# Patient Record
Sex: MALE | Race: WHITE | Employment: OTHER | URBAN - METROPOLITAN AREA
[De-identification: names, ages, dates, MRNs, and addresses within clinical notes are randomized per-mention and may not be internally consistent; named-entity substitution may affect disease eponyms.]

---

## 2020-11-10 ENCOUNTER — HOSPITAL ENCOUNTER (OUTPATIENT)
Dept: PREADMISSION TESTING | Age: 70
Discharge: HOME | End: 2020-11-14
Payer: MEDICARE

## 2020-11-10 VITALS
HEART RATE: 70 BPM | OXYGEN SATURATION: 98 % | HEIGHT: 70 IN | WEIGHT: 211 LBS | RESPIRATION RATE: 16 BRPM | DIASTOLIC BLOOD PRESSURE: 94 MMHG | SYSTOLIC BLOOD PRESSURE: 153 MMHG | BODY MASS INDEX: 30.21 KG/M2 | TEMPERATURE: 98.3 F

## 2020-11-10 LAB
ANION GAP SERPL CALCULATED.3IONS-SCNC: 10 MEQ/L (ref 9–15)
BILIRUBIN URINE: NEGATIVE
BLOOD, URINE: NEGATIVE
BUN BLDV-MCNC: 14 MG/DL (ref 8–23)
CALCIUM SERPL-MCNC: 9.2 MG/DL (ref 8.5–9.9)
CHLORIDE BLD-SCNC: 104 MEQ/L (ref 95–107)
CLARITY: CLEAR
CO2: 26 MEQ/L (ref 20–31)
COLOR: YELLOW
CREAT SERPL-MCNC: 1 MG/DL (ref 0.7–1.2)
EKG ATRIAL RATE: 69 BPM
EKG P AXIS: 33 DEGREES
EKG P-R INTERVAL: 166 MS
EKG Q-T INTERVAL: 434 MS
EKG QRS DURATION: 96 MS
EKG QTC CALCULATION (BAZETT): 465 MS
EKG R AXIS: 32 DEGREES
EKG T AXIS: 49 DEGREES
EKG VENTRICULAR RATE: 69 BPM
GFR AFRICAN AMERICAN: >60
GFR NON-AFRICAN AMERICAN: >60
GLUCOSE BLD-MCNC: 91 MG/DL (ref 70–99)
GLUCOSE URINE: NEGATIVE MG/DL
HCT VFR BLD CALC: 41.1 % (ref 42–52)
HEMOGLOBIN: 13.7 G/DL (ref 14–18)
KETONES, URINE: NEGATIVE MG/DL
LEUKOCYTE ESTERASE, URINE: NEGATIVE
MCH RBC QN AUTO: 29.3 PG (ref 27–31.3)
MCHC RBC AUTO-ENTMCNC: 33.3 % (ref 33–37)
MCV RBC AUTO: 88 FL (ref 80–100)
NITRITE, URINE: NEGATIVE
PDW BLD-RTO: 13.4 % (ref 11.5–14.5)
PH UA: 7.5 (ref 5–9)
PLATELET # BLD: 235 K/UL (ref 130–400)
POTASSIUM SERPL-SCNC: 4.1 MEQ/L (ref 3.4–4.9)
PROTEIN UA: NEGATIVE MG/DL
RBC # BLD: 4.67 M/UL (ref 4.7–6.1)
SODIUM BLD-SCNC: 140 MEQ/L (ref 135–144)
SPECIFIC GRAVITY UA: 1.01 (ref 1–1.03)
URINE REFLEX TO CULTURE: NORMAL
UROBILINOGEN, URINE: 0.2 E.U./DL
WBC # BLD: 4.9 K/UL (ref 4.8–10.8)

## 2020-11-10 PROCEDURE — 81003 URINALYSIS AUTO W/O SCOPE: CPT

## 2020-11-10 PROCEDURE — 85027 COMPLETE CBC AUTOMATED: CPT

## 2020-11-10 PROCEDURE — 93005 ELECTROCARDIOGRAM TRACING: CPT | Performed by: ORTHOPAEDIC SURGERY

## 2020-11-10 PROCEDURE — 80048 BASIC METABOLIC PNL TOTAL CA: CPT

## 2020-11-10 RX ORDER — AMLODIPINE BESYLATE 5 MG/1
5 TABLET ORAL DAILY
COMMUNITY

## 2020-11-10 RX ORDER — CEFAZOLIN SODIUM 2 G/50ML
2 SOLUTION INTRAVENOUS ONCE
Status: CANCELLED | OUTPATIENT
Start: 2020-11-17

## 2020-11-10 RX ORDER — DOXAZOSIN MESYLATE 4 MG/1
4 TABLET ORAL NIGHTLY
COMMUNITY

## 2020-11-11 ENCOUNTER — NURSE ONLY (OUTPATIENT)
Dept: PRIMARY CARE CLINIC | Age: 70
End: 2020-11-11

## 2020-11-11 PROCEDURE — 93010 ELECTROCARDIOGRAM REPORT: CPT | Performed by: INTERNAL MEDICINE

## 2020-11-12 ENCOUNTER — HOSPITAL ENCOUNTER (OUTPATIENT)
Age: 70
Setting detail: SPECIMEN
Discharge: HOME | End: 2020-11-12
Payer: MEDICARE

## 2020-11-13 LAB
SARS-COV-2: NOT DETECTED
SOURCE: NORMAL

## 2020-11-17 ENCOUNTER — ANESTHESIA (OUTPATIENT)
Dept: OPERATING ROOM | Age: 70
DRG: 483 | End: 2020-11-17
Payer: MEDICARE

## 2020-11-17 ENCOUNTER — APPOINTMENT (OUTPATIENT)
Dept: GENERAL RADIOLOGY | Age: 70
DRG: 483 | End: 2020-11-17
Attending: ORTHOPAEDIC SURGERY
Payer: MEDICARE

## 2020-11-17 ENCOUNTER — ANESTHESIA EVENT (OUTPATIENT)
Dept: OPERATING ROOM | Age: 70
DRG: 483 | End: 2020-11-17
Payer: MEDICARE

## 2020-11-17 ENCOUNTER — HOSPITAL ENCOUNTER (INPATIENT)
Age: 70
LOS: 1 days | Discharge: HOME | DRG: 483 | End: 2020-11-18
Attending: ORTHOPAEDIC SURGERY | Admitting: ORTHOPAEDIC SURGERY
Payer: MEDICARE

## 2020-11-17 VITALS
RESPIRATION RATE: 18 BRPM | DIASTOLIC BLOOD PRESSURE: 88 MMHG | OXYGEN SATURATION: 100 % | SYSTOLIC BLOOD PRESSURE: 143 MMHG

## 2020-11-17 PROBLEM — Z96.612 STATUS POST TOTAL SHOULDER ARTHROPLASTY, LEFT: Status: ACTIVE | Noted: 2020-11-17

## 2020-11-17 LAB
ABO/RH: NORMAL
ANTIBODY SCREEN: NORMAL
GRAM STAIN RESULT: NORMAL

## 2020-11-17 PROCEDURE — 6360000002 HC RX W HCPCS: Performed by: ORTHOPAEDIC SURGERY

## 2020-11-17 PROCEDURE — 73030 X-RAY EXAM OF SHOULDER: CPT

## 2020-11-17 PROCEDURE — 2709999900 HC NON-CHARGEABLE SUPPLY: Performed by: ORTHOPAEDIC SURGERY

## 2020-11-17 PROCEDURE — 7100000000 HC PACU RECOVERY - FIRST 15 MIN: Performed by: ORTHOPAEDIC SURGERY

## 2020-11-17 PROCEDURE — 87070 CULTURE OTHR SPECIMN AEROBIC: CPT

## 2020-11-17 PROCEDURE — 3600000014 HC SURGERY LEVEL 4 ADDTL 15MIN: Performed by: ORTHOPAEDIC SURGERY

## 2020-11-17 PROCEDURE — 3700000000 HC ANESTHESIA ATTENDED CARE: Performed by: ORTHOPAEDIC SURGERY

## 2020-11-17 PROCEDURE — 2580000003 HC RX 258: Performed by: ORTHOPAEDIC SURGERY

## 2020-11-17 PROCEDURE — 86850 RBC ANTIBODY SCREEN: CPT

## 2020-11-17 PROCEDURE — 6370000000 HC RX 637 (ALT 250 FOR IP): Performed by: INTERNAL MEDICINE

## 2020-11-17 PROCEDURE — 86901 BLOOD TYPING SEROLOGIC RH(D): CPT

## 2020-11-17 PROCEDURE — 0RPK0JZ REMOVAL OF SYNTHETIC SUBSTITUTE FROM LEFT SHOULDER JOINT, OPEN APPROACH: ICD-10-PCS | Performed by: ORTHOPAEDIC SURGERY

## 2020-11-17 PROCEDURE — 97166 OT EVAL MOD COMPLEX 45 MIN: CPT

## 2020-11-17 PROCEDURE — 2580000003 HC RX 258: Performed by: NURSE PRACTITIONER

## 2020-11-17 PROCEDURE — 0RRK0JZ REPLACEMENT OF LEFT SHOULDER JOINT WITH SYNTHETIC SUBSTITUTE, OPEN APPROACH: ICD-10-PCS | Performed by: ORTHOPAEDIC SURGERY

## 2020-11-17 PROCEDURE — 3600000004 HC SURGERY LEVEL 4 BASE: Performed by: ORTHOPAEDIC SURGERY

## 2020-11-17 PROCEDURE — 6370000000 HC RX 637 (ALT 250 FOR IP): Performed by: ORTHOPAEDIC SURGERY

## 2020-11-17 PROCEDURE — 86900 BLOOD TYPING SEROLOGIC ABO: CPT

## 2020-11-17 PROCEDURE — 3700000001 HC ADD 15 MINUTES (ANESTHESIA): Performed by: ORTHOPAEDIC SURGERY

## 2020-11-17 PROCEDURE — C1776 JOINT DEVICE (IMPLANTABLE): HCPCS | Performed by: ORTHOPAEDIC SURGERY

## 2020-11-17 PROCEDURE — L3650 SO 8 ABD RESTRAINT PRE OTS: HCPCS | Performed by: ORTHOPAEDIC SURGERY

## 2020-11-17 PROCEDURE — 87205 SMEAR GRAM STAIN: CPT

## 2020-11-17 PROCEDURE — 6360000002 HC RX W HCPCS: Performed by: ANESTHESIOLOGY

## 2020-11-17 PROCEDURE — 1210000000 HC MED SURG R&B

## 2020-11-17 PROCEDURE — 97162 PT EVAL MOD COMPLEX 30 MIN: CPT

## 2020-11-17 PROCEDURE — 2500000003 HC RX 250 WO HCPCS: Performed by: ORTHOPAEDIC SURGERY

## 2020-11-17 PROCEDURE — 2500000003 HC RX 250 WO HCPCS: Performed by: ANESTHESIOLOGY

## 2020-11-17 PROCEDURE — 87075 CULTR BACTERIA EXCEPT BLOOD: CPT

## 2020-11-17 DEVICE — TM REVERSE 40MM POLY LINER +6MM: Type: IMPLANTABLE DEVICE | Site: SHOULDER | Status: FUNCTIONAL

## 2020-11-17 DEVICE — SPACER HUM +12MM OFFSET 12DEG TRABECULAR MTL: Type: IMPLANTABLE DEVICE | Site: SHOULDER | Status: FUNCTIONAL

## 2020-11-17 DEVICE — IMPLANTABLE DEVICE: Type: IMPLANTABLE DEVICE | Site: SHOULDER | Status: FUNCTIONAL

## 2020-11-17 RX ORDER — HYDROCODONE BITARTRATE AND ACETAMINOPHEN 5; 325 MG/1; MG/1
2 TABLET ORAL PRN
Status: DISCONTINUED | OUTPATIENT
Start: 2020-11-17 | End: 2020-11-17 | Stop reason: HOSPADM

## 2020-11-17 RX ORDER — MEPERIDINE HYDROCHLORIDE 50 MG/ML
12.5 INJECTION INTRAMUSCULAR; INTRAVENOUS; SUBCUTANEOUS EVERY 5 MIN PRN
Status: DISCONTINUED | OUTPATIENT
Start: 2020-11-17 | End: 2020-11-17 | Stop reason: HOSPADM

## 2020-11-17 RX ORDER — PROPOFOL 10 MG/ML
INJECTION, EMULSION INTRAVENOUS PRN
Status: DISCONTINUED | OUTPATIENT
Start: 2020-11-17 | End: 2020-11-17 | Stop reason: SDUPTHER

## 2020-11-17 RX ORDER — MIDAZOLAM HYDROCHLORIDE 1 MG/ML
INJECTION INTRAMUSCULAR; INTRAVENOUS PRN
Status: DISCONTINUED | OUTPATIENT
Start: 2020-11-17 | End: 2020-11-17 | Stop reason: SDUPTHER

## 2020-11-17 RX ORDER — ACETAMINOPHEN 325 MG/1
650 TABLET ORAL EVERY 6 HOURS
Status: DISCONTINUED | OUTPATIENT
Start: 2020-11-17 | End: 2020-11-18 | Stop reason: HOSPADM

## 2020-11-17 RX ORDER — ROCURONIUM BROMIDE 10 MG/ML
INJECTION, SOLUTION INTRAVENOUS PRN
Status: DISCONTINUED | OUTPATIENT
Start: 2020-11-17 | End: 2020-11-17 | Stop reason: SDUPTHER

## 2020-11-17 RX ORDER — POLYMYXIN B 500000 [USP'U]/1
500000 INJECTION, POWDER, LYOPHILIZED, FOR SOLUTION INTRAMUSCULAR; INTRATHECAL; INTRAVENOUS; OPHTHALMIC ONCE
Status: COMPLETED | OUTPATIENT
Start: 2020-11-17 | End: 2020-11-17

## 2020-11-17 RX ORDER — DIPHENHYDRAMINE HYDROCHLORIDE 50 MG/ML
12.5 INJECTION INTRAMUSCULAR; INTRAVENOUS
Status: DISCONTINUED | OUTPATIENT
Start: 2020-11-17 | End: 2020-11-17 | Stop reason: HOSPADM

## 2020-11-17 RX ORDER — GLYCOPYRROLATE 0.2 MG/ML
INJECTION INTRAMUSCULAR; INTRAVENOUS PRN
Status: DISCONTINUED | OUTPATIENT
Start: 2020-11-17 | End: 2020-11-17 | Stop reason: SDUPTHER

## 2020-11-17 RX ORDER — MAGNESIUM HYDROXIDE 1200 MG/15ML
LIQUID ORAL CONTINUOUS PRN
Status: COMPLETED | OUTPATIENT
Start: 2020-11-17 | End: 2020-11-17

## 2020-11-17 RX ORDER — FENTANYL CITRATE 50 UG/ML
INJECTION, SOLUTION INTRAMUSCULAR; INTRAVENOUS
Status: COMPLETED
Start: 2020-11-17 | End: 2020-11-17

## 2020-11-17 RX ORDER — SODIUM CHLORIDE 0.9 % (FLUSH) 0.9 %
10 SYRINGE (ML) INJECTION EVERY 12 HOURS SCHEDULED
Status: DISCONTINUED | OUTPATIENT
Start: 2020-11-17 | End: 2020-11-17 | Stop reason: HOSPADM

## 2020-11-17 RX ORDER — ROPIVACAINE HYDROCHLORIDE 5 MG/ML
INJECTION, SOLUTION EPIDURAL; INFILTRATION; PERINEURAL PRN
Status: DISCONTINUED | OUTPATIENT
Start: 2020-11-17 | End: 2020-11-17 | Stop reason: SDUPTHER

## 2020-11-17 RX ORDER — LIDOCAINE HYDROCHLORIDE 10 MG/ML
1 INJECTION, SOLUTION EPIDURAL; INFILTRATION; INTRACAUDAL; PERINEURAL
Status: CANCELLED | OUTPATIENT
Start: 2020-11-17 | End: 2020-11-17

## 2020-11-17 RX ORDER — FENTANYL CITRATE 50 UG/ML
50 INJECTION, SOLUTION INTRAMUSCULAR; INTRAVENOUS EVERY 10 MIN PRN
Status: DISCONTINUED | OUTPATIENT
Start: 2020-11-17 | End: 2020-11-17 | Stop reason: HOSPADM

## 2020-11-17 RX ORDER — ONDANSETRON 4 MG/1
4 TABLET, ORALLY DISINTEGRATING ORAL EVERY 8 HOURS PRN
Status: DISCONTINUED | OUTPATIENT
Start: 2020-11-17 | End: 2020-11-18 | Stop reason: HOSPADM

## 2020-11-17 RX ORDER — ONDANSETRON 2 MG/ML
4 INJECTION INTRAMUSCULAR; INTRAVENOUS EVERY 6 HOURS PRN
Status: DISCONTINUED | OUTPATIENT
Start: 2020-11-17 | End: 2020-11-18 | Stop reason: HOSPADM

## 2020-11-17 RX ORDER — CEFAZOLIN SODIUM 2 G/50ML
2 SOLUTION INTRAVENOUS ONCE
Status: COMPLETED | OUTPATIENT
Start: 2020-11-17 | End: 2020-11-17

## 2020-11-17 RX ORDER — SODIUM CHLORIDE, SODIUM LACTATE, POTASSIUM CHLORIDE, CALCIUM CHLORIDE 600; 310; 30; 20 MG/100ML; MG/100ML; MG/100ML; MG/100ML
INJECTION, SOLUTION INTRAVENOUS CONTINUOUS
Status: DISCONTINUED | OUTPATIENT
Start: 2020-11-17 | End: 2020-11-17

## 2020-11-17 RX ORDER — SODIUM CHLORIDE 0.9 % (FLUSH) 0.9 %
10 SYRINGE (ML) INJECTION PRN
Status: DISCONTINUED | OUTPATIENT
Start: 2020-11-17 | End: 2020-11-18 | Stop reason: HOSPADM

## 2020-11-17 RX ORDER — SODIUM CHLORIDE 0.9 % (FLUSH) 0.9 %
10 SYRINGE (ML) INJECTION PRN
Status: DISCONTINUED | OUTPATIENT
Start: 2020-11-17 | End: 2020-11-17 | Stop reason: HOSPADM

## 2020-11-17 RX ORDER — ONDANSETRON 2 MG/ML
4 INJECTION INTRAMUSCULAR; INTRAVENOUS
Status: DISCONTINUED | OUTPATIENT
Start: 2020-11-17 | End: 2020-11-17 | Stop reason: HOSPADM

## 2020-11-17 RX ORDER — ROPIVACAINE HYDROCHLORIDE 5 MG/ML
INJECTION, SOLUTION EPIDURAL; INFILTRATION; PERINEURAL
Status: COMPLETED
Start: 2020-11-17 | End: 2020-11-17

## 2020-11-17 RX ORDER — SODIUM CHLORIDE, SODIUM LACTATE, POTASSIUM CHLORIDE, CALCIUM CHLORIDE 600; 310; 30; 20 MG/100ML; MG/100ML; MG/100ML; MG/100ML
INJECTION, SOLUTION INTRAVENOUS CONTINUOUS
Status: DISCONTINUED | OUTPATIENT
Start: 2020-11-17 | End: 2020-11-18

## 2020-11-17 RX ORDER — LIDOCAINE HYDROCHLORIDE 10 MG/ML
1 INJECTION, SOLUTION EPIDURAL; INFILTRATION; INTRACAUDAL; PERINEURAL
Status: DISCONTINUED | OUTPATIENT
Start: 2020-11-17 | End: 2020-11-17 | Stop reason: HOSPADM

## 2020-11-17 RX ORDER — FENTANYL CITRATE 50 UG/ML
INJECTION, SOLUTION INTRAMUSCULAR; INTRAVENOUS PRN
Status: DISCONTINUED | OUTPATIENT
Start: 2020-11-17 | End: 2020-11-17 | Stop reason: SDUPTHER

## 2020-11-17 RX ORDER — SODIUM CHLORIDE 0.9 % (FLUSH) 0.9 %
10 SYRINGE (ML) INJECTION EVERY 12 HOURS SCHEDULED
Status: DISCONTINUED | OUTPATIENT
Start: 2020-11-17 | End: 2020-11-18 | Stop reason: HOSPADM

## 2020-11-17 RX ORDER — TRANEXAMIC ACID 650 1/1
1950 TABLET ORAL ONCE
Status: COMPLETED | OUTPATIENT
Start: 2020-11-17 | End: 2020-11-17

## 2020-11-17 RX ORDER — TRANEXAMIC ACID 650 1/1
1950 TABLET ORAL ONCE
Status: COMPLETED | OUTPATIENT
Start: 2020-11-18 | End: 2020-11-18

## 2020-11-17 RX ORDER — SODIUM CHLORIDE, SODIUM LACTATE, POTASSIUM CHLORIDE, CALCIUM CHLORIDE 600; 310; 30; 20 MG/100ML; MG/100ML; MG/100ML; MG/100ML
INJECTION, SOLUTION INTRAVENOUS CONTINUOUS
Status: CANCELLED | OUTPATIENT
Start: 2020-11-17

## 2020-11-17 RX ORDER — CEFAZOLIN SODIUM 2 G/50ML
2 SOLUTION INTRAVENOUS EVERY 8 HOURS
Status: COMPLETED | OUTPATIENT
Start: 2020-11-17 | End: 2020-11-18

## 2020-11-17 RX ORDER — OXYCODONE HYDROCHLORIDE 5 MG/1
5 TABLET ORAL EVERY 4 HOURS PRN
Status: DISCONTINUED | OUTPATIENT
Start: 2020-11-17 | End: 2020-11-18 | Stop reason: HOSPADM

## 2020-11-17 RX ORDER — VANCOMYCIN HYDROCHLORIDE 1 G/20ML
INJECTION, POWDER, LYOPHILIZED, FOR SOLUTION INTRAVENOUS PRN
Status: DISCONTINUED | OUTPATIENT
Start: 2020-11-17 | End: 2020-11-17 | Stop reason: HOSPADM

## 2020-11-17 RX ORDER — ONDANSETRON 2 MG/ML
INJECTION INTRAMUSCULAR; INTRAVENOUS PRN
Status: DISCONTINUED | OUTPATIENT
Start: 2020-11-17 | End: 2020-11-17 | Stop reason: SDUPTHER

## 2020-11-17 RX ORDER — SODIUM CHLORIDE 0.9 % (FLUSH) 0.9 %
10 SYRINGE (ML) INJECTION PRN
Status: CANCELLED | OUTPATIENT
Start: 2020-11-17

## 2020-11-17 RX ORDER — MIDAZOLAM HYDROCHLORIDE 1 MG/ML
INJECTION INTRAMUSCULAR; INTRAVENOUS
Status: COMPLETED
Start: 2020-11-17 | End: 2020-11-17

## 2020-11-17 RX ORDER — MORPHINE SULFATE 2 MG/ML
2 INJECTION, SOLUTION INTRAMUSCULAR; INTRAVENOUS
Status: DISCONTINUED | OUTPATIENT
Start: 2020-11-17 | End: 2020-11-18 | Stop reason: HOSPADM

## 2020-11-17 RX ORDER — BACITRACIN 50000 [USP'U]/1
50000 INJECTION, POWDER, LYOPHILIZED, FOR SOLUTION INTRAMUSCULAR ONCE
Status: DISCONTINUED | OUTPATIENT
Start: 2020-11-17 | End: 2020-11-17 | Stop reason: HOSPADM

## 2020-11-17 RX ORDER — HYDROMORPHONE HCL 110MG/55ML
0.5 PATIENT CONTROLLED ANALGESIA SYRINGE INTRAVENOUS EVERY 10 MIN PRN
Status: DISCONTINUED | OUTPATIENT
Start: 2020-11-17 | End: 2020-11-17 | Stop reason: HOSPADM

## 2020-11-17 RX ORDER — HYDROCODONE BITARTRATE AND ACETAMINOPHEN 5; 325 MG/1; MG/1
1 TABLET ORAL PRN
Status: DISCONTINUED | OUTPATIENT
Start: 2020-11-17 | End: 2020-11-17 | Stop reason: HOSPADM

## 2020-11-17 RX ORDER — SENNA AND DOCUSATE SODIUM 50; 8.6 MG/1; MG/1
1 TABLET, FILM COATED ORAL 2 TIMES DAILY
Status: DISCONTINUED | OUTPATIENT
Start: 2020-11-17 | End: 2020-11-18 | Stop reason: HOSPADM

## 2020-11-17 RX ORDER — SODIUM CHLORIDE 0.9 % (FLUSH) 0.9 %
10 SYRINGE (ML) INJECTION EVERY 12 HOURS SCHEDULED
Status: CANCELLED | OUTPATIENT
Start: 2020-11-17

## 2020-11-17 RX ORDER — METOCLOPRAMIDE HYDROCHLORIDE 5 MG/ML
10 INJECTION INTRAMUSCULAR; INTRAVENOUS
Status: DISCONTINUED | OUTPATIENT
Start: 2020-11-17 | End: 2020-11-17 | Stop reason: HOSPADM

## 2020-11-17 RX ORDER — AMLODIPINE BESYLATE 5 MG/1
5 TABLET ORAL DAILY
Status: DISCONTINUED | OUTPATIENT
Start: 2020-11-17 | End: 2020-11-18 | Stop reason: HOSPADM

## 2020-11-17 RX ORDER — DOXAZOSIN 2 MG/1
4 TABLET ORAL NIGHTLY
Status: DISCONTINUED | OUTPATIENT
Start: 2020-11-17 | End: 2020-11-18 | Stop reason: HOSPADM

## 2020-11-17 RX ADMIN — CEFAZOLIN SODIUM 2 G: 2 SOLUTION INTRAVENOUS at 10:20

## 2020-11-17 RX ADMIN — SODIUM CHLORIDE, POTASSIUM CHLORIDE, SODIUM LACTATE AND CALCIUM CHLORIDE: 600; 310; 30; 20 INJECTION, SOLUTION INTRAVENOUS at 14:11

## 2020-11-17 RX ADMIN — FENTANYL CITRATE 100 MCG: 50 INJECTION INTRAMUSCULAR; INTRAVENOUS at 10:00

## 2020-11-17 RX ADMIN — SUGAMMADEX 200 MG: 100 INJECTION, SOLUTION INTRAVENOUS at 12:13

## 2020-11-17 RX ADMIN — PROPOFOL 200 MG: 10 INJECTION, EMULSION INTRAVENOUS at 10:23

## 2020-11-17 RX ADMIN — SODIUM CHLORIDE, POTASSIUM CHLORIDE, SODIUM LACTATE AND CALCIUM CHLORIDE: 600; 310; 30; 20 INJECTION, SOLUTION INTRAVENOUS at 11:30

## 2020-11-17 RX ADMIN — PHENYLEPHRINE HYDROCHLORIDE 200 MCG: 10 INJECTION INTRAVENOUS at 11:29

## 2020-11-17 RX ADMIN — PHENYLEPHRINE HYDROCHLORIDE 200 MCG: 10 INJECTION INTRAVENOUS at 10:43

## 2020-11-17 RX ADMIN — DOCUSATE SODIUM 50MG AND SENNOSIDES 8.6MG 1 TABLET: 8.6; 5 TABLET, FILM COATED ORAL at 14:10

## 2020-11-17 RX ADMIN — GLYCOPYRROLATE 0.2 MG: 0.2 INJECTION, SOLUTION INTRAMUSCULAR; INTRAVENOUS at 11:19

## 2020-11-17 RX ADMIN — CEFAZOLIN SODIUM 2 G: 2 SOLUTION INTRAVENOUS at 17:07

## 2020-11-17 RX ADMIN — ROCURONIUM BROMIDE 50 MG: 10 INJECTION INTRAVENOUS at 10:23

## 2020-11-17 RX ADMIN — ACETAMINOPHEN 650 MG: 325 TABLET ORAL at 14:10

## 2020-11-17 RX ADMIN — ROCURONIUM BROMIDE 25 MG: 10 INJECTION INTRAVENOUS at 11:38

## 2020-11-17 RX ADMIN — ROPIVACAINE HYDROCHLORIDE 40 ML: 5 INJECTION, SOLUTION EPIDURAL; INFILTRATION; PERINEURAL at 10:05

## 2020-11-17 RX ADMIN — ROCURONIUM BROMIDE 25 MG: 10 INJECTION INTRAVENOUS at 10:56

## 2020-11-17 RX ADMIN — TRANEXAMIC ACID 1950 MG: 650 TABLET ORAL at 17:09

## 2020-11-17 RX ADMIN — PHENYLEPHRINE HYDROCHLORIDE 200 MCG: 10 INJECTION INTRAVENOUS at 11:10

## 2020-11-17 RX ADMIN — DOCUSATE SODIUM 50MG AND SENNOSIDES 8.6MG 1 TABLET: 8.6; 5 TABLET, FILM COATED ORAL at 20:10

## 2020-11-17 RX ADMIN — PHENYLEPHRINE HYDROCHLORIDE 200 MCG: 10 INJECTION INTRAVENOUS at 10:53

## 2020-11-17 RX ADMIN — MIDAZOLAM HYDROCHLORIDE 2 MG: 2 INJECTION, SOLUTION INTRAMUSCULAR; INTRAVENOUS at 10:00

## 2020-11-17 RX ADMIN — DOXAZOSIN 4 MG: 2 TABLET ORAL at 20:11

## 2020-11-17 RX ADMIN — GLYCOPYRROLATE 0.2 MG: 0.2 INJECTION, SOLUTION INTRAMUSCULAR; INTRAVENOUS at 11:18

## 2020-11-17 RX ADMIN — PHENYLEPHRINE HYDROCHLORIDE 200 MCG: 10 INJECTION INTRAVENOUS at 10:48

## 2020-11-17 RX ADMIN — SUGAMMADEX 200 MG: 100 INJECTION, SOLUTION INTRAVENOUS at 12:11

## 2020-11-17 RX ADMIN — SODIUM CHLORIDE, POTASSIUM CHLORIDE, SODIUM LACTATE AND CALCIUM CHLORIDE: 600; 310; 30; 20 INJECTION, SOLUTION INTRAVENOUS at 09:14

## 2020-11-17 RX ADMIN — PHENYLEPHRINE HYDROCHLORIDE 300 MCG: 10 INJECTION INTRAVENOUS at 11:07

## 2020-11-17 RX ADMIN — TRANEXAMIC ACID 1950 MG: 650 TABLET ORAL at 09:12

## 2020-11-17 RX ADMIN — AMLODIPINE BESYLATE 5 MG: 5 TABLET ORAL at 14:10

## 2020-11-17 RX ADMIN — ACETAMINOPHEN 650 MG: 325 TABLET ORAL at 20:10

## 2020-11-17 RX ADMIN — ONDANSETRON 4 MG: 2 INJECTION INTRAMUSCULAR; INTRAVENOUS at 10:27

## 2020-11-17 ASSESSMENT — PAIN DESCRIPTION - ORIENTATION
ORIENTATION: LEFT

## 2020-11-17 ASSESSMENT — PULMONARY FUNCTION TESTS
PIF_VALUE: 15
PIF_VALUE: 11
PIF_VALUE: 1
PIF_VALUE: 11
PIF_VALUE: 3
PIF_VALUE: 12
PIF_VALUE: 15
PIF_VALUE: 12
PIF_VALUE: 12
PIF_VALUE: 15
PIF_VALUE: 5
PIF_VALUE: 15
PIF_VALUE: 1
PIF_VALUE: 15
PIF_VALUE: 11
PIF_VALUE: 11
PIF_VALUE: 15
PIF_VALUE: 14
PIF_VALUE: 1
PIF_VALUE: 25
PIF_VALUE: 15
PIF_VALUE: 15
PIF_VALUE: 14
PIF_VALUE: 15
PIF_VALUE: 12
PIF_VALUE: 15
PIF_VALUE: 16
PIF_VALUE: 15
PIF_VALUE: 11
PIF_VALUE: 15
PIF_VALUE: 14
PIF_VALUE: 15
PIF_VALUE: 11
PIF_VALUE: 1
PIF_VALUE: 15
PIF_VALUE: 15
PIF_VALUE: 13
PIF_VALUE: 15
PIF_VALUE: 1
PIF_VALUE: 12
PIF_VALUE: 15
PIF_VALUE: 14
PIF_VALUE: 15
PIF_VALUE: 12
PIF_VALUE: 25
PIF_VALUE: 11
PIF_VALUE: 15
PIF_VALUE: 12
PIF_VALUE: 14
PIF_VALUE: 15
PIF_VALUE: 16
PIF_VALUE: 12
PIF_VALUE: 15
PIF_VALUE: 15
PIF_VALUE: 1
PIF_VALUE: 15
PIF_VALUE: 12
PIF_VALUE: 15
PIF_VALUE: 15
PIF_VALUE: 14
PIF_VALUE: 15
PIF_VALUE: 14
PIF_VALUE: 11
PIF_VALUE: 15
PIF_VALUE: 12
PIF_VALUE: 15
PIF_VALUE: 15
PIF_VALUE: 12
PIF_VALUE: 15
PIF_VALUE: 15
PIF_VALUE: 14

## 2020-11-17 ASSESSMENT — PAIN DESCRIPTION - LOCATION
LOCATION: SHOULDER

## 2020-11-17 ASSESSMENT — PAIN SCALES - GENERAL
PAINLEVEL_OUTOF10: 0
PAINLEVEL_OUTOF10: 5
PAINLEVEL_OUTOF10: 0
PAINLEVEL_OUTOF10: 5
PAINLEVEL_OUTOF10: 2
PAINLEVEL_OUTOF10: 5
PAINLEVEL_OUTOF10: 5
PAINLEVEL_OUTOF10: 0
PAINLEVEL_OUTOF10: 2

## 2020-11-17 ASSESSMENT — PAIN DESCRIPTION - FREQUENCY
FREQUENCY: CONTINUOUS

## 2020-11-17 ASSESSMENT — PAIN DESCRIPTION - DESCRIPTORS
DESCRIPTORS: BURNING
DESCRIPTORS: TINGLING
DESCRIPTORS: BURNING
DESCRIPTORS: BURNING

## 2020-11-17 ASSESSMENT — PAIN DESCRIPTION - PAIN TYPE
TYPE: SURGICAL PAIN

## 2020-11-17 ASSESSMENT — PAIN - FUNCTIONAL ASSESSMENT: PAIN_FUNCTIONAL_ASSESSMENT: 0-10

## 2020-11-17 NOTE — PROGRESS NOTES
Pt is fully awake and taking sips of water with assist.  First attempt to call report to med/surg-no answer. Will attempt again.

## 2020-11-17 NOTE — PROGRESS NOTES
Physical Therapy    Medical Initial Assessment    NAME: Obie Schmitt  : 1950  MRN: [id number]    Date of Service: 2020    Patient Diagnosis(es): There were no encounter diagnoses. has a past medical history of Benign colon polyp, BPH (benign prostatic hypertrophy), Diverticul disease small and large intestine, no perforati or abscess, and Hypertension. has a past surgical history that includes hernia repair (); Colonoscopy (13); and shoulder surgery (Left, 2020). Restrictions  Restrictions/Precautions  Restrictions/Precautions: Weight Bearing, ROM Restrictions  Required Braces or Orthoses?: Yes(ultrasling at all times except hygiene)  Upper Extremity Weight Bearing Restrictions  Left Upper Extremity Weight Bearing: Non Weight Bearing  Required Braces or Orthoses  Left Upper Extremity Brace/Splint: Sling  Position Activity Restriction  Other position/activity restrictions: Okay for LUE  ROM of elbow, wrist and fingers LUE  Vision/Hearing        Subjective  General  Chart Reviewed: Yes  Patient assessed for rehabilitation services?: Yes  Referring Practitioner: Dr Leilani Zavala  Referral Date : 20  Diagnosis: post L reverse TSA  ( revision)  Subjective  Subjective: Pt reports post L TSA there was clicking and just didn't heal right.  Pt reports now surgery for reverse L TSA  Pain Screening  Patient Currently in Pain: No  Pain Assessment  Pain Assessment: 0-10  Pain Level: 0  Patient's Stated Pain Goal: No pain  Pain Type: Surgical pain  Pain Location: Shoulder  Pain Orientation: Left  Pain Descriptors: Tingling(L digits 1 and 2)  Pain Frequency: Continuous  Vital Signs  Patient Currently in Pain: No  Patient Observation  Observations: LUE ultrasling, glasses  Pre Treatment Pain Screening  Pain at present: 0  Scale Used: Numeric Score    Orientation  Orientation  Overall Orientation Status: Within Functional Limits    Social/Functional History  Social/Functional History  Lives With: Spouse  Type of Home: House  Home Layout: One level  Home Access: Level entry  Bathroom Shower/Tub: Tub/Shower unit, Walk-in shower, Shower chair without back  Bathroom Toilet: Standard  Bathroom Equipment: Grab bars in shower, Shower chair, 3-in-1 commode, Grab bars around toilet  Bathroom Accessibility: Walker accessible  Home Equipment: Rolling walker, Cane  Receives Help From: Family  ADL Assistance: Independent  Homemaking Assistance: Independent  Homemaking Responsibilities: Yes  Ambulation Assistance: Independent  Transfer Assistance: Independent  Active : Yes  Mode of Transportation: Car, Truck  Occupation: Retired  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)  Objective          AROM RLE (degrees)  RLE AROM: WFL  AROM LLE (degrees)  LLE AROM : WFL  AROM RUE (degrees)  RUE AROM : WFL  AROM LUE (degrees)  LUE General AROM: NT shoulder post op, elbow, wrist and hand WFL  Strength RLE  Strength RLE: WNL  Strength LLE  Strength LLE: WNL  Strength RUE  Strength RUE: WNL  Comment: some DJD R shoulder c/o   Strength LUE  Comment: shoulder NT due to surgery this date: elbow, wrist and hand full AROM  Coordination  Movements Are Fluid And Coordinated: Yes(SHANELL ankle DF/PF)  Sensation  Overall Sensation Status: (LE intact, pt reports tingling and warmth in L 1&2 digits)  Bed mobility  Scooting: Modified independent  Transfers  Sit to Stand: Supervision;Modified independent  Stand to sit: Supervision;Modified independent  Bed to Chair: Supervision;Modified independent  Comment: no AD  Ambulation  Ambulation?: Yes  Ambulation 1  Surface: level tile  Device: No Device  Assistance: Supervision;Modified Independent  Quality of Gait: good pace, no LOB, pushign IV independently, no tripping, no dizziness,   Distance: >400ft  Comments: pt pushing IV pole indeendently and safely   Stairs/Curb  Stairs? : (pt deferred testing, only needs one step in home. ) Balance  Sitting - Static: Good  Sitting - Dynamic: Good  Standing - Static: Good  Standing - Dynamic: Good        Assessment   Assessment: 69yom post L reverse TSA post failed L TSA this past summer. Pt with good safety awareness and mobility without AD and with IV pole management. Pt with no current PT needs. Advised pt shoudl still call RN for OOB in case of dizziness postop, pt agrees to call RN. No current PT needs as good safety. Pt will need OT follow and OP therapy per MD and protocol at discharge. 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         Therapy Time   Individual Concurrent Group Co-treatment   Time In  310         Time Out  345         Minutes  [id number] [id number]

## 2020-11-17 NOTE — PROGRESS NOTES
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  Leisure & Hobbies: Farming       Objective        Orientation  Overall Orientation Status: Within Functional Limits  Observation/Palpation  Observation: LUE ultrasling, aquacel dressing in place LUE shl, IV RUE, glasses  Functional Mobility  Functional - Mobility Device: No device  Activity: To/from bathroom  Assist Level: Contact guard assistance  Toilet Transfers  Toilet - Technique: Ambulating  Equipment Used: Grab bars  Toilet Transfer: Contact guard assistance;Stand by assistance  ADL  Feeding: Setup  Grooming: Setup  UE Bathing: Moderate assistance; Increased time to complete  LE Bathing: Contact guard assistance; Increased time to complete  UE Dressing: Maximum assistance  LE Dressing: Contact guard assistance; Increased time to complete  Toileting: Stand by assistance  Tone RUE  RUE Tone: Normotonic  Tone LUE  LUE Tone: Normotonic  Coordination  Movements Are Fluid And Coordinated: No  Coordination and Movement description: Fine motor impairments;Gross motor impairments;Decreased speed;Decreased accuracy; Left UE  Quality of Movement Other  Comment: No shoulder ROM LUE, still numb throughout LUE     Bed mobility  Supine to Sit: Modified independent  Sit to Supine: Modified independent  Transfers  Sit to stand: Stand by assistance  Stand to sit: Stand by assistance                       LUE AROM (degrees)  LUE AROM : Exceptions  LUE General AROM: no shl ROM  Left Hand AROM (degrees)  Left Hand AROM: WFL  Left Hand General AROM: still numb but demo'd WFL digital ROM  RUE AROM (degrees)  RUE AROM : WFL  Right Hand AROM (degrees)  Right Hand AROM: WFL  LUE Strength  Gross LUE Strength: Exceptions to WFL  LUE Strength Comment: NT- NWB LUE  RUE Strength  Gross RUE Strength: WFL                   Plan   Plan  Times per week: 3-6x/wk  Times per day: Daily  Plan weeks: <1- postop pt  Current Treatment Recommendations: ROM, Balance Training, Functional Mobility Training, Endurance Training, Pain Management, Safety Education & Training, Patient/Caregiver Education & Training, Self-Care / ADL, Home Management Training          Goals  Short term goals  Time Frame for Short term goals: 1-3 days- Postop pt  Short term goal 1: Demo Spv for UB dressing including doff/don LUE ultrasling  Short term goal 2: MI toileting  Short term goal 3: SBA/Spv LB dressing and bathing  Short term goal 4: Muna UB bathing  Long term goals  Time Frame for Long term goals : Same as STGs  Long term goal 1: Same as STGs  Long term goal 2: Same as STGs  Patient Goals   Patient goals : Pt hoping to return home tomorrow       Therapy Time   Individual Concurrent Group Co-treatment   Time In  2:30         Time Out  3:05         Minutes  [address], [place]

## 2020-11-17 NOTE — FLOWSHEET NOTE
Patient arrives to room 208 via bed. Patient is alert and oriented x4. Patient states his pain is not bad and does not want any PRN mediation at this time. Patient does report a feeling of tingling/ limb feels like it is asleep from left upper extremity. Patient is able to move all fingers on left upper. Patient is oriented to room and call light. Patient last BM was 11/15/2020. Patient asks about visiting restrictions but believes wife is not comfortable visiting at this time. Vitals are stable. Patient medicated per MAR. Admission assessment complete at this time. Patient is able to stand at bedside to use urinal. Call placed to dietary to place dinner order. Will continue to monitor. Call light remains in reach. 1445: Patient is able to ambulate to and from bed to bathroom with minimal assist. Tolerated well.

## 2020-11-17 NOTE — ANESTHESIA PRE PROCEDURE
Department of Anesthesiology  Preprocedure Note       Name:  Leonard Moody   Age:  78 y.o.  :  1950                                          MRN:  [id number]         Date:  2020      Surgeon: Syed Scott):  Hina Ge MD    Procedure: Procedure(s):  LEFT CONVERSION REVERSE TOTAL SHOULDER ARTHROPLASTY. BEACH CHAIR, DEPUY: BIOMET CONVERTIBLE REVERSE, *STAT GRAM STAIN & CX INTRAOP *BACITRACIN/POLYMYXIN IRRIGATION*  GENERAL, SCALENE NERVE BLOCK    Medications prior to admission:   Prior to Admission medications    Medication Sig Start Date End Date Taking? Authorizing Provider   amLODIPine (NORVASC) 5 MG tablet Take 5 mg by mouth daily    Historical Provider, MD   doxazosin (CARDURA) 4 MG tablet Take 4 mg by mouth nightly    Historical Provider, MD   aspirin 81 MG tablet Take 81 mg by mouth daily. Historical Provider, MD       Current medications:    No current facility-administered medications for this encounter. Current Outpatient Medications   Medication Sig Dispense Refill    amLODIPine (NORVASC) 5 MG tablet Take 5 mg by mouth daily      doxazosin (CARDURA) 4 MG tablet Take 4 mg by mouth nightly      aspirin 81 MG tablet Take 81 mg by mouth daily. Allergies:  No Known Allergies    Problem List:    Patient Active Problem List   Diagnosis Code    Benign prostatic hyperplasia N40.0    Hypertension I10       Past Medical History:        Diagnosis Date    Benign colon polyp     1-benign/[facility]    BPH (benign prostatic hypertrophy)     Diverticul disease small and large intestine, no perforati or abscess     c/scope of ; [facility]-next in 2018    Hypertension        Past Surgical History:        Procedure Laterality Date    COLONOSCOPY  13    DR Carrillo Workman HERNIA REPAIR         Social History:    Social History     Tobacco Use    Smoking status: Never Smoker    Smokeless tobacco: Never Used   Substance Use Topics    Alcohol use: Yes     Comment: occasionally Counseling given: Not Answered      Vital Signs (Current): There were no vitals filed for this visit. BP Readings from Last 3 Encounters:   11/10/20 (!) 153/94   01/26/16 (!) 160/100   08/11/15 130/78       NPO Status:                                                                                 BMI:   Wt Readings from Last 3 Encounters:   11/10/20 211 lb (95.7 kg)   01/25/16 207 lb (93.9 kg)   08/11/15 200 lb 12.8 oz (91.1 kg)     There is no height or weight on file to calculate BMI.    CBC:   Lab Results   Component Value Date    WBC 4.9 11/10/2020    RBC 4.67 11/10/2020    HGB 13.7 11/10/2020    HCT 41.1 11/10/2020    MCV 88.0 11/10/2020    RDW 13.4 11/10/2020     11/10/2020       CMP:   Lab Results   Component Value Date     11/10/2020    K 4.1 11/10/2020     11/10/2020    CO2 26 11/10/2020    BUN 14 11/10/2020    CREATININE 1.00 11/10/2020    GFRAA >60.0 11/10/2020    LABGLOM >60.0 11/10/2020    GLUCOSE 91 11/10/2020    PROT 7.5 08/11/2015    CALCIUM 9.2 11/10/2020    BILITOT 0.6 08/11/2015    ALKPHOS 72 08/11/2015    AST 20 08/11/2015    ALT 21 08/11/2015       POC Tests: No results for input(s): POCGLU, POCNA, POCK, POCCL, POCBUN, POCHEMO, POCHCT in the last 72 hours.     Coags: No results found for: PROTIME, INR, APTT    HCG (If Applicable): No results found for: PREGTESTUR, PREGSERUM, HCG, HCGQUANT     ABGs: No results found for: PHART, PO2ART, AAP3AVW, YGN5PHP, BEART, M6NSGAIR     Type & Screen (If Applicable):  No results found for: LABABO, LABRH    Drug/Infectious Status (If Applicable):  No results found for: HIV, HEPCAB    COVID-19 Screening (If Applicable):   Lab Results   Component Value Date    COVID19 Not Detected 11/11/2020         Anesthesia Evaluation  Patient summary reviewed and Nursing notes reviewed no history of anesthetic complications:   Airway: Mallampati: II  TM distance: >3 FB   Neck ROM: full  Mouth opening: > = 3 FB Dental: normal exam         Pulmonary:Negative Pulmonary ROS and normal exam                               Cardiovascular:    (+) hypertension:,                   Neuro/Psych:   Negative Neuro/Psych ROS              GI/Hepatic/Renal:   (+) morbid obesity          Endo/Other: Negative Endo/Other ROS                    Abdominal:   (+) obese,         Vascular: negative vascular ROS. Anesthesia Plan      general     ASA 3       Induction: intravenous. MIPS: Prophylactic antiemetics administered. Anesthetic plan and risks discussed with patient.         Attending anesthesiologist reviewed and agrees with Pre Eval content              Kenny Evans MD   11/17/2020

## 2020-11-17 NOTE — PROGRESS NOTES
Pt admits to some \"burning\"  In the left shoulder at the surgical incision. Pt is offered pain meds but states, \"I'd really rather not. \"  Pt is informed that the choice is his but the medicine is available if he changes his mind.

## 2020-11-17 NOTE — BRIEF OP NOTE
Brief Postoperative Note      Patient: Sher Burk  YOB: 1950  MRN: [id number]    Date of Procedure: 11/17/2020    Pre-Op Diagnosis: LEFT FAILED TSA    Post-Op Diagnosis: Same       Procedure(s):  LEFT CONVERSION REVERSE TOTAL SHOULDER ARTHROPLASTY. Surgeon(s):  Priyanka Car MD    Assistant:  Surgical Assistant: Adolph Harvey  Physician Assistant: Alondra Salazar PA-C    Anesthesia: General    Estimated Blood Loss (mL): Minimal    Complications: None    Specimens:   ID Type Source Tests Collected by Time Destination   1 : left shoulder  Swab Joint, Shoulder BODY FLUID CELL COUNT WITH DIFFERENTIAL, GRAM STAIN, CULTURE, ANAEROBIC AND AEROBIC Priyanka Car MD 11/17/2020 1055    2 : left shoulder tissue Tissue Joint, Shoulder BODY FLUID CELL COUNT WITH DIFFERENTIAL, GRAM STAIN, CULTURE, ANAEROBIC AND AEROBIC Priyanka Car MD 11/17/2020 1108    3 :  Body Fluid Joint, Shoulder BODY FLUID CELL COUNT WITH DIFFERENTIAL, GRAM STAIN, CULTURE, ANAEROBIC AND AEROBIC Priyanka Car MD 11/17/2020 1120        Implants:  Implant Name Type Inv.  Item Serial No.  Lot No. LRB No. Used Action   ADAPTER HUM HD STD TAPR FOR COMPHSVE REV SHLDR SYS  ADAPTER HUM HD STD TAPR FOR COMPHSVE REV SHLDR SYS  [employer] [id number] Left 1 Implanted   IMPLANT SHLDR 40MM VERSA-DIAL JENNIFER STD  IMPLANT SHLDR 40MM VERSA-DIAL JENNIFER STD  [employer] SAMMI[employer]WD  Left 1 Implanted   SPACER HUM +12MM OFFSET 12DEG TRABECULAR MTL  SPACER HUM +12MM OFFSET 12DEG TRABECULAR MTL  [employer]ILL[employer] [facility]- [id number] Left 1 Implanted   TM REVERSE 40MM POLY LINER +6MM Shoulder/Arm/Wrist/Hand TM REVERSE 40MM POLY LINER +6MM  [employer] [id number] Left 1 Implanted         Drains: * No LDAs found *    Findings: none    Electronically signed by Priyanka Car MD on 11/17/2020 at 11:58 AM

## 2020-11-17 NOTE — ANESTHESIA POSTPROCEDURE EVALUATION
Department of Anesthesiology  Postprocedure Note    Patient: Ulises Spaulding  MRN: [id number]  YOB: 1950  Date of evaluation: 11/17/2020  Time:  12:22 PM     Procedure Summary     Date:  11/17/20 Room / Location:  [address] [place] [address]    Anesthesia Start:  1020 Anesthesia Stop:      Procedure:  LEFT CONVERSION REVERSE TOTAL SHOULDER ARTHROPLASTY. (Left Shoulder) Diagnosis:  (LEFT FAILED TSA)    Surgeon:  Alicia Lugo MD Responsible Provider:  Pedro Luis Stanley MD    Anesthesia Type:  Not recorded ASA Status:  3          Anesthesia Type: No value filed. Rachana Phase I: Rachana Score: 10    Rachana Phase II:      Last vitals: Reviewed and per EMR flowsheets.        Anesthesia Post Evaluation    Patient location during evaluation: PACU  Patient participation: complete - patient participated  Level of consciousness: awake  Pain score: 0  Airway patency: patent  Nausea & Vomiting: no nausea  Complications: no  Cardiovascular status: blood pressure returned to baseline  Hydration status: euvolemic

## 2020-11-17 NOTE — ANESTHESIA PRE PROCEDURE
Department of Anesthesiology  Preprocedure Note       Name:  Asif Alvarado   Age:  78 y.o.  :  1950                                          MRN:  [id number]         Date:  2020      Surgeon: Whit Ferrari):  Giulia Dale MD    Procedure: Procedure(s):  LEFT CONVERSION REVERSE TOTAL SHOULDER ARTHROPLASTY. BEACH CHAIR, DEPUY: BIOMET CONVERTIBLE REVERSE, *STAT GRAM STAIN & CX INTRAOP *BACITRACIN/POLYMYXIN IRRIGATION*  GENERAL, SCALENE NERVE BLOCK    Medications prior to admission:   Prior to Admission medications    Medication Sig Start Date End Date Taking? Authorizing Provider   amLODIPine (NORVASC) 5 MG tablet Take 5 mg by mouth daily    Historical Provider, MD   doxazosin (CARDURA) 4 MG tablet Take 4 mg by mouth nightly    Historical Provider, MD   aspirin 81 MG tablet Take 81 mg by mouth daily.     Historical Provider, MD       Current medications:    Current Facility-Administered Medications   Medication Dose Route Frequency Provider Last Rate Last Dose    fentaNYL (SUBLIMAZE) injection 50 mcg  50 mcg Intravenous Q10 Min PRN Senthil Culp MD        HYDROmorphone (DILAUDID) injection 0.5 mg  0.5 mg Intravenous Q10 Min PRN Senthil Culp MD        HYDROcodone-acetaminophen [facility]) 5-325 MG per tablet 1 tablet  1 tablet Oral PRN Senthil Culp MD        Or    HYDROcodone-acetaminophen [facility]) 5-325 MG per tablet 2 tablet  2 tablet Oral PRN Senthil Culp MD        diphenhydrAMINE (BENADRYL) injection 12.5 mg  12.5 mg Intravenous Once PRN Senthil Culp MD        ondansetron [facility]) injection 4 mg  4 mg Intravenous Once PRN Senthil Culp MD        metoclopramide [facility]) injection 10 mg  10 mg Intravenous Once PRN Senthil Culp MD        meperidine (DEMEROL) injection 12.5 mg  12.5 mg Intravenous Q5 Min PRN Senthil Culp MD           Allergies:  No Known Allergies    Problem List:    Patient Active Problem List   Diagnosis Code  Benign prostatic hyperplasia N40.0    Hypertension I10       Past Medical History:        Diagnosis Date    Benign colon polyp     1-benign/[facility]    BPH (benign prostatic hypertrophy)     Diverticul disease small and large intestine, no perforati or abscess     c/scope of 1/13; [facility]-next in 2018    Hypertension        Past Surgical History:        Procedure Laterality Date    COLONOSCOPY  1/21/13    DR Guy Cabrera HERNIA REPAIR  1996       Social History:    Social History     Tobacco Use    Smoking status: Never Smoker    Smokeless tobacco: Never Used   Substance Use Topics    Alcohol use: Yes     Comment: occasionally                                Counseling given: Not Answered      Vital Signs (Current): There were no vitals filed for this visit. BP Readings from Last 3 Encounters:   11/10/20 (!) 153/94   01/26/16 (!) 160/100   08/11/15 130/78       NPO Status:                                                                                 BMI:   Wt Readings from Last 3 Encounters:   11/10/20 211 lb (95.7 kg)   01/25/16 207 lb (93.9 kg)   08/11/15 200 lb 12.8 oz (91.1 kg)     There is no height or weight on file to calculate BMI.    CBC:   Lab Results   Component Value Date    WBC 4.9 11/10/2020    RBC 4.67 11/10/2020    HGB 13.7 11/10/2020    HCT 41.1 11/10/2020    MCV 88.0 11/10/2020    RDW 13.4 11/10/2020     11/10/2020       CMP:   Lab Results   Component Value Date     11/10/2020    K 4.1 11/10/2020     11/10/2020    CO2 26 11/10/2020    BUN 14 11/10/2020    CREATININE 1.00 11/10/2020    GFRAA >60.0 11/10/2020    LABGLOM >60.0 11/10/2020    GLUCOSE 91 11/10/2020    PROT 7.5 08/11/2015    CALCIUM 9.2 11/10/2020    BILITOT 0.6 08/11/2015    ALKPHOS 72 08/11/2015    AST 20 08/11/2015    ALT 21 08/11/2015       POC Tests: No results for input(s): POCGLU, POCNA, POCK, POCCL, POCBUN, POCHEMO, POCHCT in the last 72 hours.     Coags: No results

## 2020-11-17 NOTE — PROGRESS NOTES
Pt report is given to [facility], RN on Med/Surg. Pt to be transported to room 208, via bed by VISHAL Brown RN.

## 2020-11-18 VITALS
RESPIRATION RATE: 18 BRPM | OXYGEN SATURATION: 92 % | HEART RATE: 88 BPM | DIASTOLIC BLOOD PRESSURE: 78 MMHG | WEIGHT: 211 LBS | BODY MASS INDEX: 30.21 KG/M2 | TEMPERATURE: 98.4 F | HEIGHT: 70 IN | SYSTOLIC BLOOD PRESSURE: 132 MMHG

## 2020-11-18 LAB
ANION GAP SERPL CALCULATED.3IONS-SCNC: 9 MEQ/L (ref 9–15)
BASOPHILS ABSOLUTE: 0 K/UL (ref 0–0.2)
BASOPHILS RELATIVE PERCENT: 0.2 %
BUN BLDV-MCNC: 14 MG/DL (ref 8–23)
CALCIUM SERPL-MCNC: 9.5 MG/DL (ref 8.5–9.9)
CHLORIDE BLD-SCNC: 102 MEQ/L (ref 95–107)
CO2: 28 MEQ/L (ref 20–31)
CREAT SERPL-MCNC: 1.03 MG/DL (ref 0.7–1.2)
EOSINOPHILS ABSOLUTE: 0 K/UL (ref 0–0.7)
EOSINOPHILS RELATIVE PERCENT: 0.2 %
GFR AFRICAN AMERICAN: >60
GFR NON-AFRICAN AMERICAN: >60
GLUCOSE BLD-MCNC: 146 MG/DL (ref 70–99)
HCT VFR BLD CALC: 39.9 % (ref 42–52)
HEMOGLOBIN: 13.4 G/DL (ref 14–18)
LYMPHOCYTES ABSOLUTE: 0.8 K/UL (ref 1–4.8)
LYMPHOCYTES RELATIVE PERCENT: 9.5 %
MCH RBC QN AUTO: 29.5 PG (ref 27–31.3)
MCHC RBC AUTO-ENTMCNC: 33.6 % (ref 33–37)
MCV RBC AUTO: 87.8 FL (ref 80–100)
MONOCYTES ABSOLUTE: 0.5 K/UL (ref 0.2–0.8)
MONOCYTES RELATIVE PERCENT: 5.9 %
NEUTROPHILS ABSOLUTE: 7.1 K/UL (ref 1.4–6.5)
NEUTROPHILS RELATIVE PERCENT: 84.2 %
PDW BLD-RTO: 13.1 % (ref 11.5–14.5)
PLATELET # BLD: 236 K/UL (ref 130–400)
POTASSIUM REFLEX MAGNESIUM: 3.9 MEQ/L (ref 3.4–4.9)
RBC # BLD: 4.54 M/UL (ref 4.7–6.1)
SODIUM BLD-SCNC: 139 MEQ/L (ref 135–144)
WBC # BLD: 8.4 K/UL (ref 4.8–10.8)

## 2020-11-18 PROCEDURE — 85025 COMPLETE CBC W/AUTO DIFF WBC: CPT

## 2020-11-18 PROCEDURE — 80048 BASIC METABOLIC PNL TOTAL CA: CPT

## 2020-11-18 PROCEDURE — 6370000000 HC RX 637 (ALT 250 FOR IP): Performed by: INTERNAL MEDICINE

## 2020-11-18 PROCEDURE — 36415 COLL VENOUS BLD VENIPUNCTURE: CPT

## 2020-11-18 PROCEDURE — 6360000002 HC RX W HCPCS: Performed by: ORTHOPAEDIC SURGERY

## 2020-11-18 PROCEDURE — 97535 SELF CARE MNGMENT TRAINING: CPT

## 2020-11-18 PROCEDURE — 6370000000 HC RX 637 (ALT 250 FOR IP): Performed by: ORTHOPAEDIC SURGERY

## 2020-11-18 RX ADMIN — ACETAMINOPHEN 650 MG: 325 TABLET ORAL at 00:28

## 2020-11-18 RX ADMIN — AMLODIPINE BESYLATE 5 MG: 5 TABLET ORAL at 08:00

## 2020-11-18 RX ADMIN — TRANEXAMIC ACID 1950 MG: 650 TABLET ORAL at 05:12

## 2020-11-18 RX ADMIN — CEFAZOLIN SODIUM 2 G: 2 SOLUTION INTRAVENOUS at 00:29

## 2020-11-18 RX ADMIN — ACETAMINOPHEN 650 MG: 325 TABLET ORAL at 08:00

## 2020-11-18 RX ADMIN — DOCUSATE SODIUM 50MG AND SENNOSIDES 8.6MG 1 TABLET: 8.6; 5 TABLET, FILM COATED ORAL at 08:00

## 2020-11-18 RX ADMIN — OXYCODONE 5 MG: 5 TABLET ORAL at 00:29

## 2020-11-18 ASSESSMENT — PAIN SCALES - GENERAL
PAINLEVEL_OUTOF10: 8
PAINLEVEL_OUTOF10: 0
PAINLEVEL_OUTOF10: 3

## 2020-11-18 NOTE — PROGRESS NOTES
no chest pain, no shortness of breath  GI: no abdominal pain, no nausea , no vomiting, no constipation  VANDA: no dysuria, frequency and urgency, no hematuria, no kidney stones  Musculoskeletal: no joint pain, swelling , stiffness  Endocrine: no polyuria, polydypsia, no cold or heat intolerence  Hematology: no anemia, no easy brusing or bleeding, no hx of clotting disorder  Dermatology: no skin rash, no eczema, no prurities,  Psychiatry: no depression, no anxiety,no panic attacks, no suicide ideation  Neurology: no syncope, no seizures, no numbness or tingling of hands, no numbness or tingling of feet, no paresis               PHYSICAL EXAM:  BP (!) 131/91   Pulse 102   Temp 98.4 °F (36.9 °C) (Oral)   Resp 18   Ht 5' 9.5\" (1.765 m)   Wt 211 lb (95.7 kg)   SpO2 92%   BMI 30.71 kg/m²   General:  Awake, alert, not in distress. Appears to be stated age. HEENT: Atraumatic, normocephalic. PERRLA. EOM intact. Pink conjunctiva, anicteric sclera. Pink and moist oral mucosa. No lymphadenopathy. Trachea midline. Thyroid non palpable. Neck supple. No carotid bruit. No JVD. Chest: Bilateal air entry, Clear to auscultation, no wheezing, rhonchi or rales. Cardiovascular: RRR, U2O9, no murmur, click, rub or gallop. No lower extremity edema. Pedal and posterior tibialis 2+. Abdomen: Soft, non tender to palpation. Active bowel sounds x 4 quadrants. Musculoskeletal: L shoulder postoperative dressing, sling  Integumentary: Pink, warm and dry. Free from rash or lesions. CNS: Oriented to person, place and time. Cranial nerves 2-12 grossly intact. Speech clear. Face symmetrical. No tremor.        Review of Labs and Diagnostic Testing:    CBC:   Recent Labs     11/18/20 0443   WBC 8.4   HGB 13.4*        BMP:    Recent Labs     11/18/20 0444      K 3.9      CO2 28   BUN 14   CREATININE 1.03   GLUCOSE 146*     Calcium:  Recent Labs     11/18/20 0444   CALCIUM 9.5     Ionized Calcium:No results for input(s): IONCA in the last 72 hours. Magnesium:No results for input(s): MG in the last 72 hours. Phosphorus:No results for input(s): PHOS in the last 72 hours. BNP:No results for input(s): BNP in the last 72 hours. Glucose:No results for input(s): POCGLU in the last 72 hours. HgbA1C: No results for input(s): LABA1C in the last 72 hours. INR: No results for input(s): INR in the last 72 hours. Hepatic: No results for input(s): ALKPHOS, ALT, AST, PROT, BILITOT, BILIDIR, LABALBU in the last 72 hours. Amylase and Lipase:No results for input(s): LACTA, AMYLASE in the last 72 hours. Lactic Acid: No results for input(s): LACTA in the last 72 hours. Troponin: No results for input(s): CKTOTAL, CKMB, TROPONINI in the last 72 hours. BNP: No results for input(s): BNP in the last 72 hours. Lipids: No results for input(s): CHOL, TRIG, HDL, LDLCALC in the last 72 hours. Invalid input(s): LDL  ABGs: No results found for: PH, PCO2, PO2, HCO3, O2SAT        Assessment:    Active Problems:    Status post total shoulder arthroplasty, left  Resolved Problems:    * No resolved hospital problems. *          Plan:  1. L shoulder arthroplasty- pain controlled, PT on case  2. HTN/tachy- add B blocker for better control, follow up clinically   3. Medically stable for acute admission at [facility]     Thank you Angie Diallo for allowing me to participate in this patient's care.       Electronically signed by Kwame Garcia MD on 11/18/2020 at 8:44 AM    Consulting Hospitalist

## 2020-11-18 NOTE — OP NOTE
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 PROCEDURES PERFORMED:  1. Revision of left total shoulder arthroplasty with conversion to a  reverse shoulder replacement using a Martin comprehensive 40 mm  glenosphere with size B offset, 12 mm humeral spacer and 40 mm x +6 mm  polyethylene liner. 2.  Revision of the left rotator cuff repair. SURGEON:  Walker Riley MD.    ASSISTANT:  Scott Mac PA-C. ANESTHETIC:  General endotracheal plus block. COMPLICATIONS:  None. ESTIMATED BLOOD LOSS:  Minimal.    CULTURES:  X3.    INDICATION:  The patient is a 65-year-old male with status post previous  left shoulder arthroplasty. He had persistent pain and superior escape  suggestive of rotator cuff dysfunction. Having failed multiple  conservative measures, he elected to proceed with revision shoulder  replacement and conversion to a reverse shoulder arthroplasty. Specific  risks were discussed including infection, stiffness, nerve damage,  continued pain as well as need for subsequent operations. Informed  consent was obtained prior to the arrival in the operating room. DESCRIPTION OF PROCEDURE:  Upon arrival, the patient was identified. He  was transported from the stretcher to the operating table and placed in  supine position.   A general endotracheal anesthetic and block were  administered by the anesthesia staff. Prior to starting the case, 2 gm  of Ancef was given intravenously. He was positioned in the supine  position with all bony prominences adequately padded. His left shoulder  and arm were prepped and draped in the usual sterile fashion. A  standard deltopectoral approach to the left shoulder was utilized in  line with his previous skin incision. The incision was again carried  through the subcutaneous tissue. Hemostasis was obtained. Scar tissue  around the deltopectoral interval was elevated. We were able to easily  mobilize the deltoid with minimal adhesions beneath. Subscapularis and  capsule appeared healed anteriorly and was elevated from the lesser  tuberosity. Tag sutures were placed in the subscapularis for later  repair. After removal of all debri, we were able to externally rotate  the humeral head and remove the head component in whole. The  polyethylene glenoid was noted to be loose and was easily removed. The  metal-backed glenoid was stable with slight bony ingrowth around. A  small osteotome was used to remove all bony osteophytes to allow  exposure of the glenosphere. Given the patient's stature and looseness,  a 40 mm glenosphere was chosen. Offset was set at the B position and  the adapter and glenosphere were then inserted with excellent purchase  noted. Multiple trials were placed on the humeral side and a +12 spacer  with +6 liner was noted to fit appropriately with good filling and range  of motion. The final components were assembled and inserted and the  shoulder was used again with good filling and full range of motion. Throughout the surgery, Irrisept solution and antibiotic-laden sterile  saline were used as well. After removal of the previous implants,  multiple cultures were taken including tissue culture, fluid culture,  and wound cultures and were sent to lab for further analysis including  for P acnes. Final irrigation was performed.   Vancomycin powder was  then inserted around the shoulder for further antibiotic coverage. The  subscapularis and capsule were repaired using 0 Vicryl suture. Rotator  cuff remnant superiorly was repaired using 0 Vicryl suture as well. Excellent rotation to 35 degrees were easily obtained. A double _____  tag suture was placed for identification purpose only. Subcutaneous  tissue was closed with 2-0 Vicryl. Skin was approximated with 4-0  Monocryl. All sponge and needle counts were correct prior to closure. Sterile dressing was applied. He was placed into a sling, awoken from  his anesthetic. He was taken to the recovery room in stable condition. Joann Bryant PA-C, was present throughout the entire case. Given the  nature of the disease process and the procedure, a skilled surgical  first assistant was necessary during the case. The assistant was  necessary to hold retractors and manipulate the extremity during the  procedure. A certified scrub tech was at the back table managing the  instruments and supplies for the surgical case.         Milagros Steinberg MD    D: 11/17/2020 12:20:40       T: 11/17/2020 13:59:31     FEDERICO/ALIYAH  Job#: [id number]     Doc#: [id number]    CC:

## 2020-11-18 NOTE — PLAN OF CARE
Problem: Pain:  Description: Pain management should include both nonpharmacologic and pharmacologic interventions.   Goal: Pain level will decrease  Description: Pain level will decrease  Outcome: Met This Shift  Goal: Control of acute pain  Description: Control of acute pain  Outcome: Met This Shift  Goal: Control of chronic pain  Description: Control of chronic pain  Outcome: Met This Shift     Problem: Falls - Risk of:  Goal: Will remain free from falls  Description: Will remain free from falls  Outcome: Met This Shift  Goal: Absence of physical injury  Description: Absence of physical injury  Outcome: Met This Shift

## 2020-11-18 NOTE — PROGRESS NOTES
Orthopedic Surgery Progress Note  Damon Collins  11/18/2020    Subjective:     Post-Operative Day # 1 Status Post left TSA /ORIF proximal humerus fracture    Systemic or Specific Complaints:No Complaints    Objective:     /78   Pulse 88   Temp 98.4 °F (36.9 °C) (Oral)   Resp 18   Ht 5' 9.5\" (1.765 m)   Wt 211 lb (95.7 kg)   SpO2 92%   BMI 30.71 kg/m²     Intake/Output Summary (Last 24 hours) at 11/18/2020 1243  Last data filed at 11/18/2020 0039  Gross per 24 hour   Intake --   Output 1000 ml   Net -1000 ml     DRAIN/TUBE OUTPUT:         General: alert, appears stated age and cooperative   Wound: Wound clean and dry no evidence of infection. Extremity: Sling on extremity.   Distal NVI   DVT Exam: No evidence of DVT seen on physical exam.     Data Review    Recent Labs     11/18/20  0443   WBC 8.4   RBC 4.54*   HGB 13.4*   HCT 39.9*   MCV 87.8   MCH 29.5   MCHC 33.6   RDW 13.1        Assessment:     Status Post left TSA /ORIF proximal humerus fracture, doing well     Plan:      1:  Continues current post-op course   2:  Continue Pain Control  3:  Physical therapy as per TSA protocol  4:  Narcotic prescription in chart  5:  Anticipate discharge today if pain well controlled    Leona Shoe

## 2020-11-18 NOTE — PROGRESS NOTES
Ryan Velazquez Initial Social Service Discharge Assessment    Met with Patient to discuss discharge plan. PCP: Mireya Wang MD                                Date of Last Visit: \" 6 months ago\" via telehealth     If no PCP, list provided? N/A    Discharge Planning    Living Arrangements: Patient reports residing at home with spouse     Who helps you with your care:  self    If lives at home:     Do you have any barriers navigating in your home? no    Patient can perform ADL? Yes    Current Services (outpatient and in home) :  None    Dialysis: No    Is transportation available to get to your appointments? Yes- Patient reports that spouse is able to assist with transportation needs     DME Equipment:  yes - cane, walker    Respiratory equipment: None    Respiratory provider:  no     [facility]  [facility] [place]    Able to afford cost of medication: Yes    Patient agreeable to [facility]? N/A    Patient agreeable to SNF/Rehab? N/A    Other discharge needs identified? N/A    Was Freedom of Choice Provided? Yes    Initial Discharge Plan? (Note: please see concurrent daily documentation for any updates after initial note). Chart reviewed. Patient was admitted to [facility] s/p left shoulder arthroplasty. This  met with patient to discuss DC options and help at home needs. Upon visit patient is alert and oriented to person, place, and situation. Mood is calm and cooperative. Patient reports residing at home with spouse. Patient reports feeling safe and well supported at home by spouse. Patient reports that he is active with the [place] and identifies no DME needs however reports [facility] have any future [facility] needs. Patient reports plan to return home with spouse and follow up with surgeon regarding when to start out patient therapy. Patient reports, \" I've been through this surgery before so I know what to do. \"  Patient reports being able

## 2020-11-18 NOTE — DISCHARGE SUMMARY
Discharge summary  This patient Bunny Winston was admitted to the hospital on 11/17/2020  after undergoing Procedure(s) (LRB):  LEFT CONVERSION REVERSE TOTAL SHOULDER ARTHROPLASTY. (Left) without complications that morning. During the postoperative period,while in hospital, patient was medically managed by the hospitalist. Please see medial notes and H&P for patients additional diagnoses. Ortho agrees with all medical diagnoses and treatments while patient in hospital.    No significant or unexpected findings noted during hospital stay. ..... Hospital course  Patient tolerated surgical procedure well and there was no complications. Patient progressed adequtly through their recovery during hospital stay including PT and rehabilitation. DVT prophylaxis was implemented POD#1  Patient was then D/C on  to Home  in stable condition. Patient was instructed on the use of pain medications, the signs and symptoms of infection, and was given our number to call should they have any questions or concerns following discharge.

## 2020-11-18 NOTE — PROGRESS NOTES
Occupational Therapy  Facility/Department: [facility] MED SURG UNIT  Daily Treatment Note  NAME: Obie Schmitt  : 1950  MRN: [id number]    Date of Service: 2020    Discharge Recommendations:  Home with assist PRN, Home with Home health OT(transition to OP PT skilled services when cleared)       Assessment   Performance deficits / Impairments: Decreased functional mobility ; Decreased ADL status; Decreased ROM; Decreased strength;Decreased endurance;Decreased balance;Decreased high-level IADLs;Decreased coordination  Assessment: Pt stated he is going home today. Pt was agreeable to shower. Pt able to doff/don LUE ultrasling with setup/SBA- had good practice from prior L TSA. Pt provided rolled towel for support under L elbow during shower. Pt bathed UB/LB after setup with Spv. Pt needed Vel for donning overhead T-shirt and long-sleeve button up. Pt also needed Vel to pull up underwear and pants. Prognosis: Good  History: multi comorb  Exam: 8 perf def/impair  REQUIRES OT FOLLOW UP: Yes  Safety Devices  Safety Devices in place: Yes  Type of devices: All fall risk precautions in place         Patient Diagnosis(es): There were no encounter diagnoses. has a past medical history of Benign colon polyp, BPH (benign prostatic hypertrophy), Diverticul disease small and large intestine, no perforati or abscess, and Hypertension. has a past surgical history that includes hernia repair (); Colonoscopy (13); and shoulder surgery (Left, 2020).     Restrictions  Restrictions/Precautions  Restrictions/Precautions: Weight Bearing, ROM Restrictions  Required Braces or Orthoses?: Yes(ultrasling at all times except hygiene)  Upper Extremity Weight Bearing Restrictions  Left Upper Extremity Weight Bearing: Non Weight Bearing  Required Braces or Orthoses  Left Upper Extremity Brace/Splint: Sling  Position Activity Restriction  Other position/activity restrictions: Okay for LUE ROM of elbow, wrist and fingers ROMY  Subjective   General  Chart Reviewed: Yes  Patient assessed for rehabilitation services?: Yes  Additional Pertinent Hx: Pt had L TSA performed in July of 2020; however, it failed which leads to pt's current sx/hospitalization  Family / Caregiver Present: No  Referring Practitioner: Dr. Olivia Topete  Diagnosis: L conversion reverse TSA  Subjective  Subjective: Pt agreeable to OT gi/tx  Vital Signs  BP Location: Right upper arm  Level of Consciousness: Alert  Patient Currently in Pain: Denies  Oxygen Therapy  O2 Device: None (Room air)  Patient Observation  Observations: ROMY hanksling, glasses     Objective    ADL  Grooming: Setup;Modified independent   UE Bathing: Setup;Modified independent   LE Bathing: Setup;Modified independent   UE Dressing: Minimal assistance(T-shirt, long-sleeve button-up)  LE Dressing: Minimal assistance(underwear, pants. Setup/MI socks and shoes)        Standing Balance  Time: 3min 20sec  Activity: during alt BUE and BLE ther ex  Comment: to inc stand coliln/end and balance for dec fall risk during ADL  Functional Mobility  Functional - Mobility Device: No device  Activity: To/from bathroom  Assist Level: Supervision  Toilet Transfers  Toilet - Technique: Ambulating  Equipment Used: Grab bars  Toilet Transfer: Contact guard assistance;Stand by assistance  Shower Transfers  Shower - Transfer Type: To and From  Shower - Transfer To:  Shower seat with back  Shower - Technique: Ambulating(small step into/out of shower stall)  Shower Transfers: Stand by assistance;Supervision  Bed mobility  Supine to Sit: Modified independent  Sit to Supine: Modified independent  Scooting: Modified independent  Transfers  Sit to stand: Supervision  Stand to sit: Supervision                       Plan   Plan  Times per week: 3-6x/wk  Times per day: Daily  Plan weeks: <1- postop pt  Current Treatment Recommendations: ROM, Balance Training, Functional Mobility Training, Endurance Training, Pain Management, Safety Education & Training, Patient/Caregiver Education & Training, Self-Care / ADL, Home Management Training          Goals  Short term goals  Time Frame for Short term goals: 1-3 days- Postop pt  Short term goal 1: Demo Spv for UB dressing including doff/don LUE ultrasling  Short term goal 2: MI toileting  Short term goal 3: SBA/Spv LB dressing and bathing  Short term goal 4: Muna UB bathing  Long term goals  Time Frame for Long term goals : Same as STGs  Long term goal 1: Same as STGs  Long term goal 2: Same as STGs  Patient Goals   Patient goals : Pt hoping to return home tomorrow       Therapy Time   Individual Concurrent Group Co-treatment   Time In  10:00         Time Out  10:30         Minutes  [address], [place]

## 2020-11-19 ENCOUNTER — CARE COORDINATION (OUTPATIENT)
Dept: CASE MANAGEMENT | Age: 70
End: 2020-11-19

## 2020-11-19 PROCEDURE — 1111F DSCHRG MED/CURRENT MED MERGE: CPT | Performed by: INTERNAL MEDICINE

## 2020-11-19 NOTE — CARE COORDINATION
[facility] Transitions Initial Follow Up Call    Call within 2 business days of discharge: Yes    Patient: Dannie Redman Patient : 1950   MRN: [id number]  Reason for Admission: -2020 Delbra Feroz s/p L conversion reverse TSA. Discharge Date: 20 RARS: Readmission Risk Score: 7  NR  2020 CV-19 lab neg. Post-op appt  8:15. [facility] Med rec/1111F order completed. CV-19 Transitions      Challenges to be reviewed by the provider   Additional needs identified to be addressed with provider No  none    Discussed COVID-19 related testing which was available at this time. Test results were negative. Patient informed of results, if available? Yes         Method of communication with provider : none    Advance Care Planning:   Does patient have an Advance Directive:  reviewed and current. Was this a readmission? No  Patient stated reason for admission: Shoulder sx. Patients top risk factors for readmission: medical condition    Care Transition Nurse (CTN) contacted the patient by telephone to perform post hospital discharge assessment. Verified name and  with patient as identifiers. Provided introduction to self, and explanation of the CTN role. CTN reviewed discharge instructions, medical action plan and red flags with patient who verbalized understanding. Patient given an opportunity to ask questions and does not have any further questions or concerns at this time. Were discharge instructions available to patient? Yes. Reviewed appropriate site of care based on symptoms and resources available to patient including: When to call 911 and Reviewed symptoms of CV-19 & Influenza to report. . The patient agrees to contact the PCP office for questions related to their healthcare. Medication reconciliation was performed with patient, who verbalizes understanding of administration of home medications.  Advised obtaining a 90-day supply of all daily and as-needed medications. Covid Risk Education    Patient has following risk factors of: no known risk factors. Education provided regarding infection prevention, and signs and symptoms of COVID-19 and when to seek medical attention with patient who verbalized understanding. Discussed exposure protocols and quarantine From CDC: Are you at higher risk for severe illness?   and given an opportunity for questions and concerns. The patient agrees to contact the COVID-19 hotline 105-524-9920 or PCP office for questions related to COVID-19. For more information on steps you can take to protect yourself, see CDC's How to Protect Yourself     Patient/family/caregiver given information for [employer] and agrees to enroll no  Patient's preferred e-mail: declines  Patient's preferred phone number: declines    Discussed follow-up appointments. If no appointment was previously scheduled, appointment scheduling offered: Scheduled. Is follow up appointment scheduled within 7 days of discharge? No  Non-[facility] follow up appointment(s):     Plan for follow-up call in 5-7 days based on severity of symptoms and risk factors. Plan for next call: Pt reports he is doing very well. Denies anyacute pain concerns and states may reach 1-2/10 w/ mobility. Using squeeze ball as directed. Declines need for Lopressor and states he feels his BP elevated at hospital because he drank coffee, was anxious, and had pain. Reports his BP today was 101/68 and his SBP usually runs in 130's or less. Advised to monitor daily BP and log to review w/ physician, v/u. PCP appt 11/30 2:00. No fevers, chills, or flu-like symptoms. CTN provided contact information for future needs.       Care Transitions 24 Hour Call    Do you have any ongoing symptoms?:  Yes  Patient-reported symptoms:  Pain  Do you have a copy of your discharge instructions?:  Yes  Do you have all of your prescriptions and are they filled?:  No  Have you been contacted by a [address]?: No  Have you scheduled your follow up appointment?:  Yes  Were you discharged with any Home Care or Post Acute Services:  No  Do you feel like you have everything you need to keep you well at home?:  Yes  Care Transitions Interventions         Follow Up  No future appointments.     Jose Rivera RN

## 2020-11-25 ENCOUNTER — CARE COORDINATION (OUTPATIENT)
Dept: CASE MANAGEMENT | Age: 70
End: 2020-11-25

## 2020-11-25 NOTE — CARE COORDINATION
[facility] Transitions Follow Up Call    2020    Patient: Zenia Lakhani  Patient : 1950   MRN: <[id number]>  Reason for Admission: shoulder arthroplasty  Discharge Date: 20 RARS: Readmission Risk Score: 7      Spoke with: Janet Ohara [facility] Transitions Subsequent and Final Call    Subsequent and Final Calls  Do you have any ongoing symptoms?:  No  Have your medications changed?:  No  Do you have any questions related to your medications?:  No  Do you currently have any active services?:  No  Do you have any needs or concerns that I can assist you with?:  No  Care Transitions Interventions  Other Interventions:          States he's doing great. Denies pain, swelling, redness or drainage around incision, fever, or flu-like symptoms. States he's taking medications as prescribed and denies any questions or concerns at this time. Has f/u appt scheduled with surgeon. Follow Up  No future appointments.     Finesse Clement

## 2020-11-27 LAB
ANAEROBIC CULTURE: NORMAL
WOUND/ABSCESS: NORMAL

## (undated) DEVICE — SPONGE LAP W18XL18IN WHT COT 4 PLY FLD STRUNG RADPQ DISP ST

## (undated) DEVICE — SUTURE MCRYL SZ 4-0 L27IN ABSRB UD L19MM PS-2 1/2 CIR PRIM Y426H

## (undated) DEVICE — KIT PT CARE SCHLEIN ULT SHLDR POS

## (undated) DEVICE — SIPS DUAL 2 MINUTE TIP

## (undated) DEVICE — 2000CC GUARDIAN II: Brand: GUARDIAN

## (undated) DEVICE — HIGH FLOW TIP

## (undated) DEVICE — 3M™ IOBAN™ 2 ANTIMICROBIAL INCISE DRAPE 6650EZ: Brand: IOBAN™ 2

## (undated) DEVICE — T4 HOOD

## (undated) DEVICE — DRAPE SURG EXT FEN REINF ST W O FLD PCH STD

## (undated) DEVICE — CHLORAPREP 26ML ORANGE

## (undated) DEVICE — GLOVE SURG SZ 75 L12IN FNGR THK94MIL STD WHT LTX FREE

## (undated) DEVICE — 3M™ MICROFOAM™ TAPE 1528-4: Brand: 3M™ MICROFOAM™

## (undated) DEVICE — SUTURE ABSORBABLE BRAIDED 0 CT-1 8X27 IN UD VICRYL JJ41G

## (undated) DEVICE — SUTURE VCRL SZ 2-0 L36IN ABSRB UD L40MM CT 1/2 CIR J957H

## (undated) DEVICE — BULB SYRINGE, IRRIGATION WITH PROTECTIVE CAP, 60 CC, INDIVIDUALLY WRAPPED: Brand: DOVER

## (undated) DEVICE — 4-PORT MANIFOLD: Brand: NEPTUNE 2

## (undated) DEVICE — GAUZE,SPONGE,4"X4",12PLY,STERILE,LF,2'S: Brand: MEDLINE

## (undated) DEVICE — SKIN MARKER,REGULAR TIP WITH RULER: Brand: DEVON

## (undated) DEVICE — COVER LT HNDL BLU PLAS

## (undated) DEVICE — SUTURE ETHBND SZ 2-0 L18IN NONABSORBABLE CT-1 L36MM 1/2 CIR CX22D

## (undated) DEVICE — INTENDED FOR TISSUE SEPARATION, AND OTHER PROCEDURES THAT REQUIRE A SHARP SURGICAL BLADE TO PUNCTURE OR CUT.: Brand: BARD-PARKER ® CARBON RIB-BACK BLADES

## (undated) DEVICE — BLADE ES L6IN ELASTOMERIC COAT EXT DURABLE BEND UPTO 90DEG

## (undated) DEVICE — CATHETERIZATION KIT PEDIATRIC 16 FR 5 CC INDWL STR TIP BG

## (undated) DEVICE — Z DISCONTINUED PER MEDLINE USE 2741943 DRESSING AQUACEL 10 IN ALG W9XL25CM SIL CVR WTRPRF VIR BACT BARR ANTIMIC

## (undated) DEVICE — GLOVE ORANGE PI 7   MSG9070

## (undated) DEVICE — MAYO STAND COVER: Brand: CONVERTORS

## (undated) DEVICE — PAD,ABDOMINAL,8"X10",ST,LF: Brand: MEDLINE

## (undated) DEVICE — GLOVE ORANGE PI 7 1/2   MSG9075

## (undated) DEVICE — 450 ML BOTTLE OF 0.05% CHLORHEXIDINE GLUCONATE IN 99.95% STERILE WATER FOR IRRIGATION, USP AND APPLICATOR.: Brand: IRRISEPT ANTIMICROBIAL WOUND LAVAGE

## (undated) DEVICE — FLUID CONTROL SHOULDER DRAPE PACK: Brand: CONVERTORS

## (undated) DEVICE — PAD THER XL AD MULTIUSE W E STRP WRAPON